# Patient Record
Sex: MALE | Race: WHITE | NOT HISPANIC OR LATINO | Employment: OTHER | URBAN - NONMETROPOLITAN AREA
[De-identification: names, ages, dates, MRNs, and addresses within clinical notes are randomized per-mention and may not be internally consistent; named-entity substitution may affect disease eponyms.]

---

## 2017-09-20 ENCOUNTER — OFFICE VISIT (OUTPATIENT)
Dept: CARDIOLOGY | Facility: CLINIC | Age: 57
End: 2017-09-20
Payer: MEDICARE

## 2017-09-20 ENCOUNTER — NON-PROVIDER VISIT (OUTPATIENT)
Dept: CARDIOLOGY | Facility: CLINIC | Age: 57
End: 2017-09-20
Payer: MEDICARE

## 2017-09-20 VITALS
WEIGHT: 290 LBS | DIASTOLIC BLOOD PRESSURE: 80 MMHG | BODY MASS INDEX: 35.31 KG/M2 | SYSTOLIC BLOOD PRESSURE: 130 MMHG | HEART RATE: 100 BPM | HEIGHT: 76 IN

## 2017-09-20 DIAGNOSIS — R07.9 CHEST PAIN, UNSPECIFIED TYPE: ICD-10-CM

## 2017-09-20 DIAGNOSIS — I10 ESSENTIAL HYPERTENSION, BENIGN: ICD-10-CM

## 2017-09-20 DIAGNOSIS — R07.9 CHEST PAIN, UNSPECIFIED TYPE: Primary | ICD-10-CM

## 2017-09-20 DIAGNOSIS — M94.0 COSTOCHONDRITIS: ICD-10-CM

## 2017-09-20 DIAGNOSIS — E78.5 DYSLIPIDEMIA: ICD-10-CM

## 2017-09-20 PROCEDURE — 93000 ELECTROCARDIOGRAM COMPLETE: CPT | Performed by: INTERNAL MEDICINE

## 2017-09-20 PROCEDURE — 99204 OFFICE O/P NEW MOD 45 MIN: CPT | Performed by: INTERNAL MEDICINE

## 2017-09-20 RX ORDER — HYDROCODONE BITARTRATE AND ACETAMINOPHEN 10; 325 MG/1; MG/1
1-2 TABLET ORAL EVERY 6 HOURS PRN
COMMUNITY
End: 2021-09-29

## 2017-09-20 RX ORDER — NAPROXEN 500 MG/1
500 TABLET ORAL PRN
COMMUNITY
End: 2023-01-18

## 2017-09-20 RX ORDER — HYDROCHLOROTHIAZIDE 25 MG/1
25 TABLET ORAL DAILY
COMMUNITY
End: 2022-06-08 | Stop reason: SDUPTHER

## 2017-09-20 RX ORDER — MINOCYCLINE HYDROCHLORIDE 50 MG/1
50 CAPSULE ORAL 2 TIMES DAILY
COMMUNITY
End: 2021-09-29

## 2017-09-20 RX ORDER — ALLOPURINOL 300 MG/1
300 TABLET ORAL DAILY
COMMUNITY

## 2017-09-20 RX ORDER — LORAZEPAM 1 MG/1
1 TABLET ORAL
COMMUNITY

## 2017-09-20 RX ORDER — LISINOPRIL 40 MG/1
40 TABLET ORAL DAILY
COMMUNITY
End: 2021-09-29

## 2017-09-20 RX ORDER — CYCLOBENZAPRINE HCL 10 MG
10 TABLET ORAL 3 TIMES DAILY PRN
COMMUNITY
End: 2022-12-21

## 2017-09-20 ASSESSMENT — ENCOUNTER SYMPTOMS
DEPRESSION: 1
SENSORY CHANGE: 1
BACK PAIN: 1

## 2017-09-20 NOTE — PROGRESS NOTES
Subjective:   Jacinto Turpin is a 57 -year-old man with history of hypertension, dyslipidemia, chronic pain syndrome, and chronic chest pain referred for the latter.    He tells me today about a sharp midsternal chest discomfort that is nonexertional and improved with pressure on his costochondral junction. That has been present for about 10 years and is quite sporadic occurring approximately 3-4 times per month. It is unchanged in frequency over the duration of time that he has had it. Also of note is that he did sustain a work injury including to his cervical vertebrae in 2007 and has been on narcotic pain medications since that time. His midsternal chest discomfort sounds to have developed in the aftermath of that in probably is partially related to chronic pain syndrome, which he seems to have.    He also tells me about fatigue that he has had for years. He sleeps only for 3-4 hours per night related to pain from his cervical and lumbar degenerative disc disease. He is unaware of his family history as he was adopted. He is a lifetime nonsmoker and drinks 2 beers per day (HeBiiCodeken).    Past Medical History:   Diagnosis Date   • Essential hypertension, benign 9/20/2017   • Dyslipidemia 9/20/2017   • Chronic pain syndrome      No past surgical history on file.  Family History   Problem Relation Age of Onset   • Other Neg Hx      unknown, adopted     History   Smoking Status   • Never Smoker   Smokeless Tobacco   • Never Used     No Known Allergies  Outpatient Encounter Prescriptions as of 9/20/2017   Medication Sig Dispense Refill   • lisinopril (PRINIVIL, ZESTRIL) 40 MG tablet Take 40 mg by mouth every day.     • hydrochlorothiazide (HYDRODIURIL) 25 MG Tab Take 25 mg by mouth every day.     • allopurinol (ZYLOPRIM) 300 MG Tab Take 300 mg by mouth every day.     • lorazepam (ATIVAN) 1 MG Tab Take 1 mg by mouth every four hours as needed for Anxiety.     • minocycline (MINOCIN) 50 MG Cap Take 50 mg by mouth 2 times a  "day.     • Multiple Vitamins-Minerals (CENTRUM SILVER PO) Take  by mouth.     • cyclobenzaprine (FLEXERIL) 10 MG Tab Take 10 mg by mouth 3 times a day as needed.     • naproxen (NAPROSYN) 500 MG Tab Take 500 mg by mouth 2 times a day, with meals.     • hydrocodone/acetaminophen (NORCO)  MG Tab Take 1-2 Tabs by mouth every 6 hours as needed.       No facility-administered encounter medications on file as of 9/20/2017.      Review of Systems   Constitutional: Positive for malaise/fatigue.   Cardiovascular: Positive for chest pain.   Musculoskeletal: Positive for back pain and joint pain.   Neurological: Positive for sensory change.   Psychiatric/Behavioral: Positive for depression. Negative for suicidal ideas.   All other systems reviewed and are negative.       Objective:   /80   Pulse 100   Ht 1.93 m (6' 4\")   Wt (!) 131.5 kg (290 lb)   BMI 35.30 kg/m²     Physical Exam   Constitutional: He is oriented to person, place, and time. He appears well-developed and well-nourished. No distress.   Pleasant, middle-aged man in no distress   HENT:   Head: Normocephalic and atraumatic.   Eyes: Conjunctivae and EOM are normal. Pupils are equal, round, and reactive to light. No scleral icterus.   Neck: Neck supple. No JVD present. No tracheal deviation present.   Cardiovascular: Normal rate, regular rhythm, normal heart sounds and intact distal pulses.  Exam reveals no gallop and no friction rub.    No murmur heard.  Pulses:       Dorsalis pedis pulses are 2+ on the right side, and 2+ on the left side.   No carotid bruits   Pulmonary/Chest: Effort normal and breath sounds normal. No stridor. No respiratory distress. He has no wheezes. He has no rales.   Abdominal: Soft. Bowel sounds are normal. He exhibits no distension.   Musculoskeletal: He exhibits edema (Trace to 1+ bilateral lower extremity edema).   Neurological: He is alert and oriented to person, place, and time.   Skin: Skin is warm and dry. No rash " noted. He is not diaphoretic. No erythema. No pallor.   Psychiatric: He has a normal mood and affect. Judgment and thought content normal.   Vitals reviewed.    Labs, 2/9/2017  CBC: WBC 6.5, hemoglobin 16.1, platelets 158  Chemstrip panel: Sodium 139, potassium 2.9, cleared 102, CO2 24, BUN 17, creatinine 1.19, glucose 137, calcium 9.5  LFTs, 3/25/2017: AST 36, ALT 66, alkaline phosphatase 55, albumin 3.9, total protein 7.5, total bilirubin 0.6  Lipids, 3/25/2017: LDL 92, HDL 74, triglycerides 369, total cholesterol 207    EKG, 7/5/2017, my interpretation: Normal sinus rhythm, no ischemic changes, normal QT interval    Assessment:     1. Chest pain, unspecified type  EKG    Echocardiogram Comp w/o Cont   2. Dyslipidemia     3. Costochondritis     4. Essential hypertension, benign         Medical Decision Making:  Today's Assessment / Status / Plan:     The chest pain that he describes to me today is atypical for angina and consistent with costochondritis. However, in the setting of his lower extremity edema and essential hypertension I did order an echocardiogram to further work that up. Preliminarily, his echocardiogram shows mild concentric left ventricular hypertrophy and normal left ventricular systolic function with no focal wall motion abnormality. At this point, I have not changed his antihypertensive regimen but I did encourage lifestyle modification. I do not think he needs additional testing at this time. I would consider putting him on a statin in the future, but would guide that decision with the coronary calcium scan. Again, at this time in addition to his antihypertensive regimen I would only encourage lifestyle modification.    Carter Rutherford MD  Cardiologist, [facility] [place]

## 2017-09-20 NOTE — LETTER
Name:          Jacinto Turpin   YOB: 1960  Date:     9/20/2017      Mayra Lee, RICKI  [address] [facility] [address]     Carter Rutherford MD  [address], [address] [place] [address]  Phone: [phone]  Back Line: [phone]  Fax: [phone]  E-mail: [email or web address]   Dear Dr. Soni Lee,    We had the pleasure of seeing your patient, Jacinto Turpin, in Cardiology Clinic at [facility] and Vascular today.    As you know, he is a 57-year-old man with history of hypertension, dyslipidemia, chronic pain syndrome, and chronic chest pain referred for the latter.    The chest pain that he describes to me today is atypical for angina and consistent with costochondritis. However, in the setting of his lower extremity edema and essential hypertension I did order an echocardiogram to further work that up. Preliminarily, his echocardiogram shows mild concentric left ventricular hypertrophy and normal left ventricular systolic function with no focal wall motion abnormality. At this point, I have not changed his antihypertensive regimen but I did encourage lifestyle modification. I do not think he needs additional testing at this time. I would consider putting him on a statin in the future, but would guide that decision with the coronary calcium scan. Again, at this time in addition to his antihypertensive regimen I would only encourage lifestyle modification.    Return in about 1 year (around 9/20/2018).    Thank you for the referral and please do not hesitate to contact me at any time. My contact information is listed above.    This note was dictated using Dragon speech recognition software.     A full note including my physical examination and a full list of rectified medications is available in our medical record, and can be faxed as well.    Carter Rutherford MD  Cardiologist  [facility]

## 2017-09-24 LAB — EKG IMPRESSION: NORMAL

## 2017-09-27 LAB
LV EJECT FRACT  99904: 65
LV EJECT FRACT MOD 2C 99903: 76.42
LV EJECT FRACT MOD 4C 99902: 64.64
LV EJECT FRACT MOD BP 99901: 68.9

## 2017-10-02 ENCOUNTER — TELEPHONE (OUTPATIENT)
Dept: CARDIOLOGY | Facility: MEDICAL CENTER | Age: 57
End: 2017-10-02

## 2017-10-02 NOTE — TELEPHONE ENCOUNTER
----- Message from Carter Rutherford M.D. sent at 10/2/2017 12:30 PM PDT -----  Structurally normal heart on echocardiogram.    SHANE JO

## 2017-10-02 NOTE — TELEPHONE ENCOUNTER
Called patient and advised him of Dr. Carter Rutherford's echocardiogram interpretation.    BEVERLEY ORTIZ

## 2019-04-15 ENCOUNTER — OFFICE VISIT (OUTPATIENT)
Dept: CARDIOLOGY | Facility: MEDICAL CENTER | Age: 59
End: 2019-04-15
Payer: MEDICARE

## 2019-04-15 VITALS
HEART RATE: 76 BPM | SYSTOLIC BLOOD PRESSURE: 140 MMHG | BODY MASS INDEX: 36.9 KG/M2 | WEIGHT: 303 LBS | DIASTOLIC BLOOD PRESSURE: 86 MMHG | HEIGHT: 76 IN | OXYGEN SATURATION: 94 %

## 2019-04-15 DIAGNOSIS — E78.5 DYSLIPIDEMIA: ICD-10-CM

## 2019-04-15 DIAGNOSIS — I89.0 LYMPHEDEMA: ICD-10-CM

## 2019-04-15 DIAGNOSIS — I10 ESSENTIAL HYPERTENSION, BENIGN: ICD-10-CM

## 2019-04-15 DIAGNOSIS — Z91.89 OTHER SPECIFIED PERSONAL RISK FACTORS, NOT ELSEWHERE CLASSIFIED: ICD-10-CM

## 2019-04-15 PROCEDURE — 99214 OFFICE O/P EST MOD 30 MIN: CPT | Performed by: INTERNAL MEDICINE

## 2019-04-15 RX ORDER — FLUTICASONE PROPIONATE 50 MCG
SPRAY, SUSPENSION (ML) NASAL
Refills: 6 | COMMUNITY
Start: 2019-04-11 | End: 2022-06-08

## 2019-04-15 RX ORDER — BUDESONIDE AND FORMOTEROL FUMARATE DIHYDRATE 160; 4.5 UG/1; UG/1
1 AEROSOL RESPIRATORY (INHALATION) 2 TIMES DAILY
COMMUNITY
End: 2024-02-07

## 2019-04-15 ASSESSMENT — ENCOUNTER SYMPTOMS
DEPRESSION: 1
BACK PAIN: 1
SENSORY CHANGE: 1

## 2019-04-15 NOTE — LETTER
Name:          Jacinto Turpin   YOB: 1960  Date:     04/15/2019      Watson Interiano P.A.-C.  [address] [place] [id number]     Carter Rutherford MD  [address] [place] [address]  Phone: [phone]  Back Line: [phone]  Fax: [phone]  E-mail: [email or web address]   Dear Dr. Interiano,    We had the pleasure of seeing your patient, Jacinto Turpin, in Cardiology Clinic at [facility].    As you know, he is a 58-year-old man with history of hypertension, dyslipidemia, chronic pain syndrome, and chronic chest pain referred for the latter.    He is doing overall well today from a cardiovascular perspective and has no significant complaints.  He tells me that his lower extremity edema is well controlled with treatment at the lymphedema clinic through [facility].     His blood pressure today is 140/86 mmHg though lower than that at home.     I reviewed with him his previously mildly abnormal lipids and recommended a coronary calcium scan which I ordered in addition to routine labs including his history panel, and lipids.    Return in about 1 year (around 4/15/2020).    Thank you for the referral and please do not hesitate to contact me at any time. My contact information is listed above.    This note was dictated using Dragon speech recognition software.     A full note including my physical examination and a full list of rectified medications is available in our medical record, and can be faxed as well.    Carter Rutherford MD  Cardiologist  [facility]

## 2019-04-15 NOTE — PROGRESS NOTES
Subjective:   Jacinto Turpin is a 58 -year-old man with history of hypertension, dyslipidemia, chronic pain syndrome, and chronic chest pain referred for the latter.    He is doing quite well today, and tells me that he has no more recurrence of the episodes of chest discomfort consistent with his previous costochondritis.  His lower extremity edema has improved with therapy for his lymphedema, and his blood pressure is generally well controlled at home.    He tells me about growing up in [place] with continued ovulation growth leading him ultimately to live in [place], [place].    Past Medical History:   Diagnosis Date   • Chronic pain syndrome    • Dyslipidemia 9/20/2017   • Essential hypertension, benign 9/20/2017     History reviewed. No pertinent surgical history.  Family History   Problem Relation Age of Onset   • Other Neg Hx         unknown, adopted     History   Smoking Status   • Never Smoker   Smokeless Tobacco   • Never Used     No Known Allergies  Outpatient Encounter Prescriptions as of 4/15/2019   Medication Sig Dispense Refill   • fluticasone (FLONASE) 50 MCG/ACT nasal spray   6   • budesonide-formoterol (SYMBICORT) 160-4.5 MCG/ACT Aerosol Inhale 2 Puffs by mouth 2 Times a Day.     • lisinopril (PRINIVIL, ZESTRIL) 40 MG tablet Take 40 mg by mouth every day.     • hydrochlorothiazide (HYDRODIURIL) 25 MG Tab Take 25 mg by mouth every day.     • allopurinol (ZYLOPRIM) 300 MG Tab Take 300 mg by mouth every day.     • lorazepam (ATIVAN) 1 MG Tab Take 1 mg by mouth every four hours as needed for Anxiety.     • minocycline (MINOCIN) 50 MG Cap Take 50 mg by mouth 2 times a day.     • Multiple Vitamins-Minerals (CENTRUM SILVER PO) Take  by mouth.     • cyclobenzaprine (FLEXERIL) 10 MG Tab Take 10 mg by mouth 3 times a day as needed.     • naproxen (NAPROSYN) 500 MG Tab Take 500 mg by mouth 2 times a day, with meals.     • hydrocodone/acetaminophen (NORCO)  MG Tab Take 1-2 Tabs by mouth every  "6 hours as needed.       No facility-administered encounter medications on file as of 4/15/2019.      Review of Systems   Constitutional: Positive for malaise/fatigue.   Cardiovascular: Positive for leg swelling (Improved with lymphedema therapy).   Musculoskeletal: Positive for back pain and joint pain.   Neurological: Positive for sensory change.   Psychiatric/Behavioral: Positive for depression. Negative for suicidal ideas.   All other systems reviewed and are negative.       Objective:   /86 (BP Location: Left arm, Patient Position: Sitting)   Pulse 76   Ht 1.93 m (6' 4\")   Wt (!) 137.4 kg (303 lb)   SpO2 94%   BMI 36.88 kg/m²     Physical Exam   Constitutional: He is oriented to person, place, and time. He appears well-developed and well-nourished. No distress.   Pleasant, middle-aged man in no distress.  Physical examination is unchanged compared to my previous on 9/20/2017 except where specified.   HENT:   Head: Normocephalic and atraumatic.   Eyes: Pupils are equal, round, and reactive to light. Conjunctivae and EOM are normal. No scleral icterus.   Neck: Neck supple. No JVD present. No tracheal deviation present.   Cardiovascular: Normal rate, regular rhythm, normal heart sounds and intact distal pulses.  Exam reveals no gallop and no friction rub.    No murmur heard.  Pulses:       Dorsalis pedis pulses are 2+ on the right side, and 2+ on the left side.   No carotid bruits   Pulmonary/Chest: Effort normal and breath sounds normal. No stridor. No respiratory distress. He has no wheezes. He has no rales.   Abdominal: Soft. Bowel sounds are normal. He exhibits no distension.   Musculoskeletal: He exhibits edema (1-2+ bilateral lower extremity edema).   Neurological: He is alert and oriented to person, place, and time.   Skin: Skin is warm and dry. No rash noted. He is not diaphoretic. No erythema. No pallor.   Psychiatric: He has a normal mood and affect. Judgment and thought content normal. "   Nursing note and vitals reviewed.    Labs, 2/9/2017  CBC: WBC 6.5, hemoglobin 16.1, platelets 158  Chemstrip panel: Sodium 139, potassium 2.9, cleared 102, CO2 24, BUN 17, creatinine 1.19, glucose 137, calcium 9.5  LFTs, 3/25/2017: AST 36, ALT 66, alkaline phosphatase 55, albumin 3.9, total protein 7.5, total bilirubin 0.6  Lipids, 3/25/2017: LDL 92, HDL 74, triglycerides 369, total cholesterol 207    EKG, 7/5/2017, my interpretation: Normal sinus rhythm, no ischemic changes, normal QT interval    Echocardiogram, 9/20/2017: Normal left ventricular ejection fraction, aortic sclerosis, and no other significant valve abnormalities.  Left atrial volume was reported as normal and he had mild concentric left ventricular hypertrophy.    Assessment:     1. Lymphedema     2. Essential hypertension, benign  Basic Metabolic Panel    CBC WITH DIFFERENTIAL   3. Dyslipidemia  LIPID PANEL   4. Other specified personal risk factors, not elsewhere classified  CT-CARDIAC SCORING       Medical Decision Making:  Today's Assessment / Status / Plan:     He is doing overall well today from a cardiovascular perspective and has no significant complaints.  He tells me that his lower extremity edema is well controlled with treatment at the lymphedema clinic through [facility].    His blood pressure today is 140/86 mmHg though lower than that at home.    I reviewed with him his previously mildly abnormal lipids and recommended a coronary calcium scan which I ordered in addition to routine labs including his history panel, and lipids.    Carter Rutherford MD  Cardiologist, [facility] [place]     Return in about 1 year (around 4/15/2020).    Physical Exam   Constitutional: He is oriented to person, place, and time. He appears well-developed and well-nourished. No distress.   Pleasant, middle-aged man in no distress.  Physical examination is unchanged compared to my previous on 9/20/2017 except where specified.   HENT:   Head:  Normocephalic and atraumatic.   Eyes: Pupils are equal, round, and reactive to light. Conjunctivae and EOM are normal. No scleral icterus.   Neck: Neck supple. No JVD present. No tracheal deviation present.   Cardiovascular: Normal rate, regular rhythm, normal heart sounds and intact distal pulses.  Exam reveals no gallop and no friction rub.    No murmur heard.  Pulses:       Dorsalis pedis pulses are 2+ on the right side, and 2+ on the left side.   No carotid bruits   Pulmonary/Chest: Effort normal and breath sounds normal. No stridor. No respiratory distress. He has no wheezes. He has no rales.   Abdominal: Soft. Bowel sounds are normal. He exhibits no distension.   Musculoskeletal: He exhibits edema (1-2+ bilateral lower extremity edema).   Neurological: He is alert and oriented to person, place, and time.   Skin: Skin is warm and dry. No rash noted. He is not diaphoretic. No erythema. No pallor.   Psychiatric: He has a normal mood and affect. Judgment and thought content normal.   Nursing note and vitals reviewed.

## 2019-05-15 ENCOUNTER — HOSPITAL ENCOUNTER (OUTPATIENT)
Dept: RADIOLOGY | Facility: MEDICAL CENTER | Age: 59
End: 2019-05-15
Attending: INTERNAL MEDICINE

## 2019-05-15 DIAGNOSIS — Z91.89 OTHER SPECIFIED PERSONAL RISK FACTORS, NOT ELSEWHERE CLASSIFIED: ICD-10-CM

## 2019-05-15 PROCEDURE — 4410556 CT-CARDIAC SCORING

## 2021-09-22 ENCOUNTER — TELEPHONE (OUTPATIENT)
Dept: CARDIOLOGY | Facility: PHYSICIAN GROUP | Age: 61
End: 2021-09-22

## 2021-09-22 NOTE — TELEPHONE ENCOUNTER
Spoke with patient about appointment with AB on 9/29/2021 at 0815. PP of Dr. Rutherford. Needs to get established with new cardiologist. Recently had blood pressure meds changed and wants to make sure the new dosage is working. Confirmed date, time, and location of appointment.

## 2021-09-29 ENCOUNTER — OFFICE VISIT (OUTPATIENT)
Dept: CARDIOLOGY | Facility: PHYSICIAN GROUP | Age: 61
End: 2021-09-29
Payer: MEDICARE

## 2021-09-29 VITALS
OXYGEN SATURATION: 95 % | DIASTOLIC BLOOD PRESSURE: 90 MMHG | HEIGHT: 76 IN | BODY MASS INDEX: 37.51 KG/M2 | RESPIRATION RATE: 14 BRPM | HEART RATE: 80 BPM | WEIGHT: 308 LBS | SYSTOLIC BLOOD PRESSURE: 144 MMHG

## 2021-09-29 DIAGNOSIS — J45.20 MILD INTERMITTENT ASTHMA, UNSPECIFIED WHETHER COMPLICATED: ICD-10-CM

## 2021-09-29 DIAGNOSIS — M94.0 COSTOCHONDRITIS: ICD-10-CM

## 2021-09-29 DIAGNOSIS — I10 ESSENTIAL HYPERTENSION, BENIGN: ICD-10-CM

## 2021-09-29 DIAGNOSIS — E78.5 DYSLIPIDEMIA: ICD-10-CM

## 2021-09-29 PROCEDURE — 99214 OFFICE O/P EST MOD 30 MIN: CPT | Performed by: NURSE PRACTITIONER

## 2021-09-29 RX ORDER — BUSPIRONE HYDROCHLORIDE 7.5 MG/1
TABLET ORAL
COMMUNITY
Start: 2021-08-26 | End: 2022-06-08

## 2021-09-29 RX ORDER — LOSARTAN POTASSIUM 50 MG/1
75 TABLET ORAL
COMMUNITY
Start: 2021-08-26 | End: 2021-09-29 | Stop reason: SDUPTHER

## 2021-09-29 RX ORDER — LOSARTAN POTASSIUM 100 MG/1
100 TABLET ORAL DAILY
Qty: 100 TABLET | Refills: 3 | Status: SHIPPED | OUTPATIENT
Start: 2021-09-29 | End: 2022-06-08 | Stop reason: SDUPTHER

## 2021-09-29 RX ORDER — MINOCYCLINE HYDROCHLORIDE 100 MG/1
100 CAPSULE ORAL PRN
COMMUNITY
Start: 2021-08-30

## 2021-09-29 RX ORDER — ALBUTEROL SULFATE 90 UG/1
2 AEROSOL, METERED RESPIRATORY (INHALATION)
COMMUNITY
Start: 2021-08-26

## 2021-09-29 ASSESSMENT — ENCOUNTER SYMPTOMS
SHORTNESS OF BREATH: 0
FEVER: 0
PALPITATIONS: 0
MYALGIAS: 0
BRUISES/BLEEDS EASILY: 0
ORTHOPNEA: 0
COUGH: 0
NAUSEA: 0
BACK PAIN: 1
PND: 0
INSOMNIA: 0
ABDOMINAL PAIN: 0
CHILLS: 0
LOSS OF CONSCIOUSNESS: 0
DIZZINESS: 0
HEADACHES: 0

## 2021-09-29 NOTE — PROGRESS NOTES
Chief Complaint   Patient presents with   • Follow-Up   • HTN (Controlled)   • Hyperlipidemia   • Costochondritis   • Asthma       Subjective     Jacinto Turpin is a 61 y.o. male who presents today for overdue annual follow-up of HTN, hyperlipidemia and costochondritis.    Jacinto is a 61 year old male with history of hypertension, hyperlipidemia, mild asthma and costochondritis, previously followed by Dr. LANG Rutherford, and last seen in April 2019.    Since then, he did have an open wound on his left foot that has finally healed. From a cardiac standpoint, he has been stable: no chest pain, pressure or discomfort; no symptomatic palpitations; no shortness of breath, orthopnea or PND; no dizziness or syncope; very mild, stable LE edema. BP is quite labile, running 100-150 systolic at home. He is now taking Losartan 75mg daily. He sleeps fair.    Past Medical History:   Diagnosis Date   • Asthma    • Chronic pain syndrome    • Costochondritis    • Dyslipidemia    • Essential hypertension, benign 09/2017     Echocardiogram with normal LV size, mild concentric LVH, LVEF 65%. Normal RA, LA and RV. Trace MR.     History reviewed. No pertinent surgical history.  Family History   Problem Relation Age of Onset   • Other Neg Hx         unknown, adopted     Social History     Socioeconomic History   • Marital status: Single     Spouse name: Not on file   • Number of children: Not on file   • Years of education: Not on file   • Highest education level: Not on file   Occupational History   • Not on file   Tobacco Use   • Smoking status: Never Smoker   • Smokeless tobacco: Never Used   Substance and Sexual Activity   • Alcohol use: Yes     Alcohol/week: 8.4 oz     Types: 14 Cans of beer per week   • Drug use: No   • Sexual activity: Not on file   Other Topics Concern   • Not on file   Social History Narrative   • Not on file     Social Determinants of Health     Financial Resource Strain:    • Difficulty of Paying Living Expenses:     Food Insecurity:    • Worried About Running Out of Food in the Last Year:    • Ran Out of Food in the Last Year:    Transportation Needs:    • Lack of Transportation (Medical):    • Lack of Transportation (Non-Medical):    Physical Activity:    • Days of Exercise per Week:    • Minutes of Exercise per Session:    Stress:    • Feeling of Stress :    Social Connections:    • Frequency of Communication with Friends and Family:    • Frequency of Social Gatherings with Friends and Family:    • Attends Denominational Services:    • Active Member of Clubs or Organizations:    • Attends Club or Organization Meetings:    • Marital Status:    Intimate Partner Violence:    • Fear of Current or Ex-Partner:    • Emotionally Abused:    • Physically Abused:    • Sexually Abused:      No Known Allergies  Outpatient Encounter Medications as of 9/29/2021   Medication Sig Dispense Refill   • minocycline (MINOCIN) 100 MG Cap      • busPIRone (BUSPAR) 7.5 MG tablet      • albuterol 108 (90 Base) MCG/ACT Aero Soln inhalation aerosol      • VITAMIN E PO Take  by mouth.     • Omega-3 Fatty Acids (OMEGA 3 PO) Take  by mouth.     • losartan (COZAAR) 100 MG Tab Take 1 Tablet by mouth every day. 100 Tablet 3   • fluticasone (FLONASE) 50 MCG/ACT nasal spray   6   • budesonide-formoterol (SYMBICORT) 160-4.5 MCG/ACT Aerosol Inhale 1 Puff 2 times a day.     • hydrochlorothiazide (HYDRODIURIL) 25 MG Tab Take 25 mg by mouth every day.     • allopurinol (ZYLOPRIM) 300 MG Tab Take 300 mg by mouth every day.     • lorazepam (ATIVAN) 1 MG Tab Take 1 mg by mouth every four hours as needed for Anxiety.     • Multiple Vitamins-Minerals (CENTRUM SILVER PO) Take  by mouth.     • cyclobenzaprine (FLEXERIL) 10 MG Tab Take 10 mg by mouth 3 times a day as needed.     • naproxen (NAPROSYN) 500 MG Tab Take 500 mg by mouth 2 times a day, with meals.     • [DISCONTINUED] losartan (COZAAR) 50 MG Tab 75 mg.     • [DISCONTINUED] lisinopril (PRINIVIL, ZESTRIL) 40 MG tablet  "Take 40 mg by mouth every day. (Patient not taking: Reported on 9/29/2021)     • [DISCONTINUED] minocycline (MINOCIN) 50 MG Cap Take 50 mg by mouth 2 times a day. (Patient not taking: Reported on 9/29/2021)     • [DISCONTINUED] hydrocodone/acetaminophen (NORCO)  MG Tab Take 1-2 Tabs by mouth every 6 hours as needed. (Patient not taking: Reported on 9/29/2021)       No facility-administered encounter medications on file as of 9/29/2021.     Review of Systems   Constitutional: Negative for chills and fever.   HENT: Negative for congestion.    Respiratory: Negative for cough and shortness of breath.    Cardiovascular: Negative for chest pain, palpitations, orthopnea, leg swelling and PND.   Gastrointestinal: Negative for abdominal pain and nausea.   Musculoskeletal: Positive for back pain and joint pain. Negative for myalgias.   Skin: Negative for rash.   Neurological: Negative for dizziness, loss of consciousness and headaches.   Endo/Heme/Allergies: Does not bruise/bleed easily.   Psychiatric/Behavioral: The patient does not have insomnia.               Objective     /90 (BP Location: Left arm, Patient Position: Sitting, BP Cuff Size: Adult)   Pulse 80   Resp 14   Ht 1.93 m (6' 4\")   Wt (!) 140 kg (308 lb)   SpO2 95%   BMI 37.49 kg/m²     Physical Exam  Constitutional:       Appearance: He is well-developed.      Comments: BMI 37.49   HENT:      Head: Normocephalic.   Neck:      Vascular: No JVD.   Cardiovascular:      Rate and Rhythm: Normal rate and regular rhythm.      Heart sounds: Normal heart sounds.   Pulmonary:      Effort: Pulmonary effort is normal. No respiratory distress.      Breath sounds: Normal breath sounds. No wheezing or rales.   Abdominal:      General: Bowel sounds are normal. There is no distension.      Palpations: Abdomen is soft.      Tenderness: There is no abdominal tenderness.   Musculoskeletal:         General: Normal range of motion.      Cervical back: Normal range of " motion and neck supple.   Skin:     General: Skin is warm and dry.      Findings: No rash.   Neurological:      Mental Status: He is alert and oriented to person, place, and time.       CONCLUSIONS OF ECHOCARDIOGRAM OF 9/27/2017:  No prior study is available for comparison.   Normal left ventricular systolic function.   No evidence of valvular abnormality based on Doppler evaluation.   Unable to estimate pulmonary artery pressure due to an inadequate   tricuspid regurgitant jet.     RESULTS OF CTCS OF 5/15/2019:  LM 0.0  LCx 0.0  LAD 0.0  RCA 0.0  Total 0.00  Assessment & Plan     1. Essential hypertension, benign  losartan (COZAAR) 100 MG Tab   2. Dyslipidemia     3. Costochondritis     4. Mild intermittent asthma, unspecified whether complicated         Medical Decision Making: Today's Assessment/Status/Plan:      1. Treated with Losartan 75mg. To increase to 100mg once daily. Monitor BP at home, and contact us in 2-3 weeks with BP report.    2. Hyperlipidemia, not currently on any therapy. CTCS score is 0.00. No need for statin at this time.    3. Costochondritis, with no recent symptoms.    4. Asthma, mild, treated with inhalers, stable.    As above, increase Losartan. Monitor BP at home. Otherwise, same medications. FU with me in 6 months, sooner if clinical condition changes.

## 2022-06-08 ENCOUNTER — OFFICE VISIT (OUTPATIENT)
Dept: CARDIOLOGY | Facility: PHYSICIAN GROUP | Age: 62
End: 2022-06-08
Payer: MEDICARE

## 2022-06-08 VITALS
HEIGHT: 76 IN | RESPIRATION RATE: 18 BRPM | WEIGHT: 315 LBS | BODY MASS INDEX: 38.36 KG/M2 | DIASTOLIC BLOOD PRESSURE: 88 MMHG | SYSTOLIC BLOOD PRESSURE: 136 MMHG | OXYGEN SATURATION: 96 % | HEART RATE: 98 BPM

## 2022-06-08 DIAGNOSIS — E78.5 DYSLIPIDEMIA: ICD-10-CM

## 2022-06-08 DIAGNOSIS — I10 ESSENTIAL HYPERTENSION, BENIGN: ICD-10-CM

## 2022-06-08 DIAGNOSIS — J45.20 MILD INTERMITTENT ASTHMA, UNSPECIFIED WHETHER COMPLICATED: ICD-10-CM

## 2022-06-08 DIAGNOSIS — I89.0 LYMPHEDEMA: ICD-10-CM

## 2022-06-08 PROCEDURE — 99214 OFFICE O/P EST MOD 30 MIN: CPT | Performed by: NURSE PRACTITIONER

## 2022-06-08 RX ORDER — ALBUTEROL SULFATE 90 UG/1
2 AEROSOL, METERED RESPIRATORY (INHALATION) EVERY 6 HOURS PRN
COMMUNITY
End: 2022-06-08

## 2022-06-08 RX ORDER — HYDROCHLOROTHIAZIDE 25 MG/1
25 TABLET ORAL DAILY
Qty: 100 TABLET | Refills: 3 | Status: SHIPPED | OUTPATIENT
Start: 2022-06-08 | End: 2023-07-13

## 2022-06-08 RX ORDER — LOSARTAN POTASSIUM 100 MG/1
100 TABLET ORAL DAILY
Qty: 100 TABLET | Refills: 3 | Status: SHIPPED | OUTPATIENT
Start: 2022-06-08 | End: 2023-07-13

## 2022-06-08 RX ORDER — BUDESONIDE AND FORMOTEROL FUMARATE DIHYDRATE 160; 4.5 UG/1; UG/1
2 AEROSOL RESPIRATORY (INHALATION)
COMMUNITY
End: 2022-06-08

## 2022-06-08 RX ORDER — LOSARTAN POTASSIUM 50 MG/1
100 TABLET ORAL DAILY
COMMUNITY
End: 2022-06-08

## 2022-06-08 RX ORDER — BUSPIRONE HYDROCHLORIDE 7.5 MG/1
7.5 TABLET ORAL DAILY
COMMUNITY
End: 2022-06-08

## 2022-06-08 RX ORDER — FLUTICASONE PROPIONATE 50 MCG
SPRAY, SUSPENSION (ML) NASAL DAILY
COMMUNITY

## 2022-06-08 RX ORDER — METOPROLOL SUCCINATE 50 MG/1
50 TABLET, EXTENDED RELEASE ORAL DAILY
Qty: 100 TABLET | Refills: 3 | Status: SHIPPED | OUTPATIENT
Start: 2022-06-08 | End: 2023-07-13

## 2022-06-08 RX ORDER — HYDROCHLOROTHIAZIDE 25 MG/1
25 TABLET ORAL EVERY MORNING
COMMUNITY
End: 2022-06-08

## 2022-06-08 ASSESSMENT — ENCOUNTER SYMPTOMS
ABDOMINAL PAIN: 0
ORTHOPNEA: 0
INSOMNIA: 0
PND: 0
DIZZINESS: 0
FEVER: 0
BRUISES/BLEEDS EASILY: 0
HEADACHES: 0
MYALGIAS: 0
COUGH: 0
CHILLS: 0
BACK PAIN: 1
SHORTNESS OF BREATH: 0
PALPITATIONS: 0
NAUSEA: 0
LOSS OF CONSCIOUSNESS: 0

## 2022-06-08 NOTE — PROGRESS NOTES
Chief Complaint   Patient presents with   • Follow-Up   • HTN (Controlled)   • Hyperlipidemia   • Asthma       Subjective     Jacinto Turpin is a 61 y.o. male who presents today for follow-up of elevated BP, hyperlipidemia, and asthma.    Jacinto is a 61 year old male with history of hypertension, hyperlipidemia (with CTCS score 0.00 in 2019), mild asthma and costochondritis, last seen by me in September 2021.    He does have a wound on his right second toe, followed by wound clinic at [facility].    More recently, he has had some episodes of high BP, running 170-190 systolic over  diastolic. He does admit to putting on some weight.      From a cardiac standpoint, he has been stable: no chest pain, pressure or discomfort; no symptomatic palpitations; no shortness of breath, orthopnea or PND; no dizziness or syncope; stable LE edema. He does have chronic lymphedema.    Past Medical History:   Diagnosis Date   • Asthma    • Chronic pain syndrome    • Costochondritis    • Dyslipidemia    • Essential hypertension, benign 09/2017     Echocardiogram with normal LV size, mild concentric LVH, LVEF 65%. Normal RA, LA and RV. Trace MR.   • Lymphedema      History reviewed. No pertinent surgical history.  Family History   Problem Relation Age of Onset   • Other Neg Hx         unknown, adopted     Social History     Socioeconomic History   • Marital status: Single     Spouse name: Not on file   • Number of children: Not on file   • Years of education: Not on file   • Highest education level: Not on file   Occupational History   • Not on file   Tobacco Use   • Smoking status: Never Smoker   • Smokeless tobacco: Never Used   Substance and Sexual Activity   • Alcohol use: Not Currently     Alcohol/week: 12.6 oz     Types: 21 Standard drinks or equivalent per week   • Drug use: No   • Sexual activity: Not on file   Other Topics Concern   • Not on file   Social History Narrative   • Not on file     Social Determinants of Health      Financial Resource Strain: Not on file   Food Insecurity: Not on file   Transportation Needs: Not on file   Physical Activity: Not on file   Stress: Not on file   Social Connections: Not on file   Intimate Partner Violence: Not on file   Housing Stability: Not on file     No Known Allergies  Outpatient Encounter Medications as of 6/8/2022   Medication Sig Dispense Refill   • fluticasone (FLONASE) 50 MCG/ACT nasal spray Administer  into affected nostril(S) every day.     • metoprolol SR (TOPROL XL) 50 MG TABLET SR 24 HR Take 1 Tablet by mouth every day. 100 Tablet 3   • losartan (COZAAR) 100 MG Tab Take 1 Tablet by mouth every day. 100 Tablet 3   • hydroCHLOROthiazide (HYDRODIURIL) 25 MG Tab Take 1 Tablet by mouth every day. 100 Tablet 3   • minocycline (MINOCIN) 100 MG Cap      • albuterol 108 (90 Base) MCG/ACT Aero Soln inhalation aerosol      • VITAMIN E PO Take  by mouth.     • Omega-3 Fatty Acids (OMEGA 3 PO) Take  by mouth.     • budesonide-formoterol (SYMBICORT) 160-4.5 MCG/ACT Aerosol Inhale 1 Puff 2 times a day.     • allopurinol (ZYLOPRIM) 300 MG Tab Take 300 mg by mouth every day.     • lorazepam (ATIVAN) 1 MG Tab Take 1 mg by mouth every four hours as needed for Anxiety.     • Multiple Vitamins-Minerals (CENTRUM SILVER PO) Take  by mouth.     • cyclobenzaprine (FLEXERIL) 10 mg Tab Take 10 mg by mouth 3 times a day as needed.     • naproxen (NAPROSYN) 500 MG Tab Take 500 mg by mouth 2 times a day, with meals.     • [DISCONTINUED] busPIRone (BUSPAR) 7.5 MG tablet Take 7.5 mg by mouth every day. (Patient not taking: Reported on 6/8/2022)     • [DISCONTINUED] losartan (COZAAR) 50 MG Tab Take 100 mg by mouth every day. (Patient not taking: Reported on 6/8/2022)     • [DISCONTINUED] albuterol 108 (90 Base) MCG/ACT Aero Soln inhalation aerosol Inhale 2 Puffs every 6 hours as needed. (Patient not taking: Reported on 6/8/2022)     • [DISCONTINUED] budesonide-formoterol (SYMBICORT) 160-4.5 MCG/ACT Aerosol  "Inhale 2 Puffs. (Patient not taking: Reported on 6/8/2022)     • [DISCONTINUED] hydroCHLOROthiazide (HYDRODIURIL) 25 MG Tab Take 25 mg by mouth every morning. (Patient not taking: Reported on 6/8/2022)     • [DISCONTINUED] busPIRone (BUSPAR) 7.5 MG tablet  (Patient not taking: Reported on 6/8/2022)     • [DISCONTINUED] losartan (COZAAR) 100 MG Tab Take 1 Tablet by mouth every day. 100 Tablet 3   • [DISCONTINUED] fluticasone (FLONASE) 50 MCG/ACT nasal spray  (Patient not taking: Reported on 6/8/2022)  6   • [DISCONTINUED] hydrochlorothiazide (HYDRODIURIL) 25 MG Tab Take 25 mg by mouth every day.       No facility-administered encounter medications on file as of 6/8/2022.     Review of Systems   Constitutional: Negative for chills and fever.   HENT: Negative for congestion.    Respiratory: Negative for cough and shortness of breath.    Cardiovascular: Positive for leg swelling. Negative for chest pain, palpitations, orthopnea and PND.        History of lymphedema.   Gastrointestinal: Negative for abdominal pain and nausea.   Musculoskeletal: Positive for back pain and joint pain. Negative for myalgias.   Skin: Negative for rash.   Neurological: Negative for dizziness, loss of consciousness and headaches.   Endo/Heme/Allergies: Does not bruise/bleed easily.   Psychiatric/Behavioral: The patient does not have insomnia.               Objective     /88 (BP Location: Left arm, Patient Position: Sitting, BP Cuff Size: Adult)   Pulse 98   Resp 18   Ht 1.93 m (6' 4\")   Wt (!) 144 kg (317 lb 0.3 oz)   SpO2 96%   BMI 38.59 kg/m²     Physical Exam  Constitutional:       Appearance: He is well-developed.      Comments: BMI 38.59 (weight is up)   HENT:      Head: Normocephalic.   Neck:      Vascular: No JVD.   Cardiovascular:      Rate and Rhythm: Normal rate and regular rhythm.      Heart sounds: Normal heart sounds.   Pulmonary:      Effort: Pulmonary effort is normal. No respiratory distress.      Breath sounds: " Normal breath sounds. No wheezing or rales.   Abdominal:      General: Bowel sounds are normal. There is no distension.      Palpations: Abdomen is soft.      Tenderness: There is no abdominal tenderness.   Musculoskeletal:         General: Normal range of motion.      Cervical back: Normal range of motion and neck supple.   Skin:     General: Skin is warm and dry.      Findings: No rash.   Neurological:      Mental Status: He is alert and oriented to person, place, and time.       CONCLUSIONS OF ECHOCARDIOGRAM OF 9/27/2017:  No prior study is available for comparison.   Normal left ventricular systolic function.   No evidence of valvular abnormality based on Doppler evaluation.   Unable to estimate pulmonary artery pressure due to an inadequate   tricuspid regurgitant jet.    RESULTS OF CTCS OF 5/15/2019:  LM 0.0  LCx 0.0  LAD 0.0  RCA 0.0  Total 0.00    Copies of recent labs have been requested from [facility].    Assessment & Plan     1. Essential hypertension, benign  metoprolol SR (TOPROL XL) 50 MG TABLET SR 24 HR    losartan (COZAAR) 100 MG Tab    hydroCHLOROthiazide (HYDRODIURIL) 25 MG Tab   2. Dyslipidemia  Comp Metabolic Panel    Lipid Profile   3. Mild intermittent asthma, unspecified whether complicated     4. Lymphedema         Medical Decision Making: Today's Assessment/Status/Plan:      1. Hypertension, treated with Losartan and HCTZ, fair control. To add some Toprol XL 50mg once daily. Check BP at home. Try to work on weigh tlsos.    2. Hyperlipidemia, to obtain recent labs.    3. Asthma, treated/stable.    4. Lymphedema, with wound of right second toe, followed by wound clinic.    Same medications, and add Toprol XL 50mg once daily. Follow-up in 6 months, sooner if clinical condition changes.

## 2022-12-16 ENCOUNTER — TELEPHONE (OUTPATIENT)
Dept: CARDIOLOGY | Facility: MEDICAL CENTER | Age: 62
End: 2022-12-16
Payer: MEDICARE

## 2022-12-16 NOTE — TELEPHONE ENCOUNTER
Spoke to patient regarding lab work ordered by AB at last OV. Patient requested lab orders to be sent to [facility]. Patient stated he will complete labs prior to his upcoming appt with AB on 12/21/22. Patient is aware lab work is fasting. Lab orders faxed to [facility] lab at fax # [phone]. Confirmed with patient upcoming appt date and time with AB.

## 2022-12-21 ENCOUNTER — TELEPHONE (OUTPATIENT)
Dept: CARDIOLOGY | Facility: PHYSICIAN GROUP | Age: 62
End: 2022-12-21

## 2022-12-21 ENCOUNTER — OFFICE VISIT (OUTPATIENT)
Dept: CARDIOLOGY | Facility: PHYSICIAN GROUP | Age: 62
End: 2022-12-21
Payer: MEDICARE

## 2022-12-21 VITALS
WEIGHT: 315 LBS | OXYGEN SATURATION: 97 % | BODY MASS INDEX: 38.36 KG/M2 | HEART RATE: 92 BPM | SYSTOLIC BLOOD PRESSURE: 136 MMHG | RESPIRATION RATE: 16 BRPM | HEIGHT: 76 IN | DIASTOLIC BLOOD PRESSURE: 84 MMHG

## 2022-12-21 DIAGNOSIS — R79.89 ELEVATED LFTS: ICD-10-CM

## 2022-12-21 DIAGNOSIS — J45.20 MILD INTERMITTENT ASTHMA, UNSPECIFIED WHETHER COMPLICATED: ICD-10-CM

## 2022-12-21 DIAGNOSIS — E78.5 DYSLIPIDEMIA: ICD-10-CM

## 2022-12-21 DIAGNOSIS — I10 ESSENTIAL HYPERTENSION, BENIGN: ICD-10-CM

## 2022-12-21 PROCEDURE — 99214 OFFICE O/P EST MOD 30 MIN: CPT | Performed by: NURSE PRACTITIONER

## 2022-12-21 ASSESSMENT — ENCOUNTER SYMPTOMS
LOSS OF CONSCIOUSNESS: 0
HEADACHES: 0
SHORTNESS OF BREATH: 0
ABDOMINAL PAIN: 0
ORTHOPNEA: 0
BACK PAIN: 1
NAUSEA: 0
CHILLS: 0
COUGH: 0
PALPITATIONS: 0
PND: 0
INSOMNIA: 0
FEVER: 0
BRUISES/BLEEDS EASILY: 0
MYALGIAS: 0
DIZZINESS: 0

## 2022-12-21 NOTE — PROGRESS NOTES
Chief Complaint   Patient presents with    Follow-Up    HTN (Controlled)    Hyperlipidemia       Subjective     Jacinto Turpin is a 62 y.o. male who presents today for annual follow-up of HTN and hyperlipidemia.    Jacinto is a 62 year old male with history of hypertension, hyperlipidemia, mild asthma and costochondritis, previously followed by Dr. LANG Rutherford, and last seen by me in September 2021.     Since then, he did have an open wound on his left foot that has finally healed. From a cardiac standpoint, he has been stable: no chest pain, pressure or discomfort; no symptomatic palpitations; no shortness of breath, orthopnea or PND; no dizziness or syncope; very mild, stable LE edema. BP is quite labile, running 100-150 systolic at home. He is now taking Losartan 75mg daily. He sleeps fair.    Past Medical History:   Diagnosis Date    Asthma     Chronic pain syndrome     Costochondritis     Dyslipidemia     Essential hypertension, benign 09/2017     Echocardiogram with normal LV size, mild concentric LVH, LVEF 65%. Normal RA, LA and RV. Trace MR.    Lymphedema      History reviewed. No pertinent surgical history.  Family History   Problem Relation Age of Onset    Other Neg Hx         unknown, adopted     Social History     Socioeconomic History    Marital status: Single     Spouse name: Not on file    Number of children: Not on file    Years of education: Not on file    Highest education level: Not on file   Occupational History    Not on file   Tobacco Use    Smoking status: Never    Smokeless tobacco: Never   Substance and Sexual Activity    Alcohol use: Not Currently     Alcohol/week: 18.0 oz     Types: 30 Cans of beer per week    Drug use: No    Sexual activity: Not on file   Other Topics Concern    Not on file   Social History Narrative    Not on file     Social Determinants of Health     Financial Resource Strain: Not on file   Food Insecurity: Not on file   Transportation Needs: Not on file   Physical  Activity: Not on file   Stress: Not on file   Social Connections: Not on file   Intimate Partner Violence: Not on file   Housing Stability: Not on file     No Known Allergies  Outpatient Encounter Medications as of 12/21/2022   Medication Sig Dispense Refill    fluticasone (FLONASE) 50 MCG/ACT nasal spray Administer  into affected nostril(S) every day.      metoprolol SR (TOPROL XL) 50 MG TABLET SR 24 HR Take 1 Tablet by mouth every day. 100 Tablet 3    losartan (COZAAR) 100 MG Tab Take 1 Tablet by mouth every day. 100 Tablet 3    hydroCHLOROthiazide (HYDRODIURIL) 25 MG Tab Take 1 Tablet by mouth every day. 100 Tablet 3    minocycline (MINOCIN) 100 MG Cap Take 100 mg by mouth as needed.      albuterol 108 (90 Base) MCG/ACT Aero Soln inhalation aerosol       VITAMIN E PO Take  by mouth.      Omega-3 Fatty Acids (OMEGA 3 PO) Take  by mouth.      budesonide-formoterol (SYMBICORT) 160-4.5 MCG/ACT Aerosol Inhale 1 Puff 2 times a day.      allopurinol (ZYLOPRIM) 300 MG Tab Take 300 mg by mouth every day.      lorazepam (ATIVAN) 1 MG Tab Take 1 mg by mouth every four hours as needed for Anxiety.      Multiple Vitamins-Minerals (CENTRUM SILVER PO) Take  by mouth.      naproxen (NAPROSYN) 500 MG Tab Take 500 mg by mouth as needed.      [DISCONTINUED] cyclobenzaprine (FLEXERIL) 10 mg Tab Take 10 mg by mouth 3 times a day as needed. (Patient not taking: Reported on 12/21/2022)       No facility-administered encounter medications on file as of 12/21/2022.     Review of Systems   Constitutional:  Negative for chills and fever.   HENT:  Negative for congestion.    Respiratory:  Negative for cough and shortness of breath.    Cardiovascular:  Negative for chest pain, palpitations, orthopnea, leg swelling and PND.   Gastrointestinal:  Negative for abdominal pain and nausea.   Musculoskeletal:  Positive for back pain and joint pain. Negative for myalgias.   Skin:  Negative for rash.   Neurological:  Negative for dizziness, loss of  "consciousness and headaches.   Endo/Heme/Allergies:  Does not bruise/bleed easily.   Psychiatric/Behavioral:  The patient does not have insomnia.             Objective     /84 (BP Location: Left arm, Patient Position: Sitting, BP Cuff Size: Adult)   Pulse 92   Resp 16   Ht 1.93 m (6' 4\")   Wt (!) 145 kg (319 lb 3.6 oz)   SpO2 97%   BMI 38.86 kg/m²     Physical Exam  Constitutional:       Appearance: He is well-developed.      Comments: BMI 38.86   HENT:      Head: Normocephalic.   Neck:      Vascular: No JVD.   Cardiovascular:      Rate and Rhythm: Normal rate and regular rhythm.      Heart sounds: Normal heart sounds.   Pulmonary:      Effort: Pulmonary effort is normal. No respiratory distress.      Breath sounds: Normal breath sounds. No wheezing or rales.   Abdominal:      General: Bowel sounds are normal. There is no distension.      Palpations: Abdomen is soft.      Tenderness: There is no abdominal tenderness.   Musculoskeletal:         General: Normal range of motion.      Cervical back: Normal range of motion and neck supple.   Skin:     General: Skin is warm and dry.      Findings: No rash.   Neurological:      Mental Status: He is alert and oriented to person, place, and time.     FINDINGS OF CTCS OF 5/15/2019:  Coronary calcification:  LMA - 0.0  LCX - 0.0  LAD - 0.0  RCA - 0.0  PDA - 0.0  Total Calcium Score: 0.0    CONCLUSIONS OF ECHOCARDIOGRAM OF 9/27/2017:  No prior study is available for comparison.   Normal left ventricular systolic function.   No evidence of valvular abnormality based on Doppler evaluation.   Unable to estimate pulmonary artery pressure due to an inadequate   tricuspid regurgitant jet.    LABS AS OF 12/19/2022:  Vitamin D 61.5  HgbA1c 5.9  PSA 1.11  TSH 1.30  Cholesterol 203  Triglycerides 114  HDL 77  LDL 92  Cholesterol/HDL ratio 2.6  Potassium 4.0  Glucose 133  BUN 25  Creatinine 1.43  AST 44    GFR 53  WBC 8.03  RBC 5.67  Hgb 17.0  Hct 50.9  Platelets 13.7   "   LABS AS OF 6/2/2022:  WBC 5.19  RBC 5.31  Hgb 16.1  Hct 47.9  Platelets 160  Glucose 155  BUN 16  Creatinine 1.30  Potassium 3.67      Cholesterol 181  Triglycerides 196  HDL 57  LDL 92  Cholesterol/HDL ration 3.2  GFR 60  HgbA1c 5.6    Assessment & Plan     1. Essential hypertension, benign  CBC WITH DIFFERENTIAL      2. Dyslipidemia        3. Elevated LFTs  Comp Metabolic Panel      4. Mild intermittent asthma, unspecified whether complicated            Medical Decision Making: Today's Assessment/Status/Plan:      1. Hypertension, treated with Toprol XL50mg, Losartan 100mg and HCTZ 25mg once daily. BP is good today.    2. Hyperlipidemia, treated with diet alone. CTCS in 2019 was 0.00.    3. Elevated LFTs, improved. Watch EtOH and Tylenol intake. Will monitor over time.    4. Mild asthma, on inhalers, stable.    Same medications for now. To repeat labs. Follow-up 6-12 months, sooner if clinical condition changes.

## 2023-05-24 ENCOUNTER — OFFICE VISIT (OUTPATIENT)
Dept: NEUROLOGY | Facility: MEDICAL CENTER | Age: 63
End: 2023-05-24
Attending: PSYCHIATRY & NEUROLOGY
Payer: MEDICARE

## 2023-05-24 VITALS
TEMPERATURE: 98.2 F | HEART RATE: 96 BPM | OXYGEN SATURATION: 98 % | WEIGHT: 301.81 LBS | HEIGHT: 76 IN | BODY MASS INDEX: 36.75 KG/M2 | DIASTOLIC BLOOD PRESSURE: 88 MMHG | SYSTOLIC BLOOD PRESSURE: 128 MMHG

## 2023-05-24 DIAGNOSIS — G62.1 ALCOHOLIC PERIPHERAL NEUROPATHY (HCC): ICD-10-CM

## 2023-05-24 PROCEDURE — 3079F DIAST BP 80-89 MM HG: CPT | Performed by: PSYCHIATRY & NEUROLOGY

## 2023-05-24 PROCEDURE — 99212 OFFICE O/P EST SF 10 MIN: CPT | Performed by: PSYCHIATRY & NEUROLOGY

## 2023-05-24 PROCEDURE — 99205 OFFICE O/P NEW HI 60 MIN: CPT | Performed by: PSYCHIATRY & NEUROLOGY

## 2023-05-24 PROCEDURE — 3074F SYST BP LT 130 MM HG: CPT | Performed by: PSYCHIATRY & NEUROLOGY

## 2023-05-24 ASSESSMENT — PATIENT HEALTH QUESTIONNAIRE - PHQ9
5. POOR APPETITE OR OVEREATING: 0 - NOT AT ALL
CLINICAL INTERPRETATION OF PHQ2 SCORE: 2
SUM OF ALL RESPONSES TO PHQ QUESTIONS 1-9: 8

## 2023-05-24 NOTE — PROGRESS NOTES
"[facility] NEUROLOGY  GENERAL NEUROLOGY  NEW PATIENT VISIT    Referral source: Azael Morel PA-C    CC: \"neuralgia and neuritis, unspecified\"    HISTORY OF ILLNESS:  Jacinto Turpin is a 62 y.o. man with a history most notable for asthma (mild, intermittent), HTN, and HLD.  Today, he was unaccompanied, and he provided the following history:    Childhood:  Jacinto had difficulty with balance.  He did not participate in sports in school due to asthma.    During early- and middle adulthood his symptoms were stable.    1983:  Jacinto was a \".\"  He has struggled with back pain for ~40 years.  He started drinking beer (2-3 6-packs/day).    2003:  Jacinto developed numbness in the feet.  He describes this as a \"loss of sensation.\"  This was attributed to neuropathy or lymphedema.    2007:  Jacinto experienced a work injury.  He was struck on the head, and three of his cervical discs were \"blown.\"  He damaged several teeth.  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  He was told he had a \"stroke.\"  His doctors didn't recommend any changes to his medication regimen.    The back pain became progressively worse.  Now, he can't even stretch out his back.    6/8/2021:  EMG/NCS was performed.  The results can be found in the media section in the document uploaded 6/9/2021 on page 8.    His balance has worsened during the past few years.    Jacinto was adopted, so his family history is unknown.      MEDICAL AND SURGICAL HISTORY:  Past Medical History:   Diagnosis Date    Asthma     Bronchitis     Chronic pain syndrome     Costochondritis     Dyslipidemia     Essential hypertension, benign 09/2017     Echocardiogram with normal LV size, mild concentric LVH, LVEF 65%. Normal RA, LA and RV. Trace MR.    Lymphedema     Neuropathy     Stroke (HCC)     small 06 or 07, no residual deficits     Past Surgical History:   Procedure Laterality Date    PB OSTEOTOMY METATARSAL (NOT 1ST) Left 1/27/2023    Procedure: LEFT FOURTH METATARSAL OSTEOTOMY;  " Surgeon: Edgardo Quiles D.P.M.;  Location: SURGERY [facility];  Service: Podiatry    HERNIA REPAIR      above scrotum, also undesceded testicle    MUSCLE REPAIR Right     calf and tendon repair    ORCHIECTOMY      SINUSOTOMIES      turnbinates, clipped uvula, deviated septum     MEDICATIONS:  Current Outpatient Medications   Medication Sig    meloxicam (MOBIC) 7.5 MG Tab Take 7.5 mg by mouth 1 time a day as needed.    asa/apap/caffeine (EXCEDRIN) 250-250-65 MG Tab Take 1 Tablet by mouth every 6 hours as needed for Headache. Indications: Arthritis, Headache    fluticasone (FLONASE) 50 MCG/ACT nasal spray Administer  into affected nostril(S) every day.    metoprolol SR (TOPROL XL) 50 MG TABLET SR 24 HR Take 1 Tablet by mouth every day.    losartan (COZAAR) 100 MG Tab Take 1 Tablet by mouth every day.    hydroCHLOROthiazide (HYDRODIURIL) 25 MG Tab Take 1 Tablet by mouth every day.    minocycline (MINOCIN) 100 MG Cap Take 100 mg by mouth as needed.    albuterol 108 (90 Base) MCG/ACT Aero Soln inhalation aerosol 2 Puffs 1 time a day as needed.    VITAMIN E PO Take  by mouth every day.    Omega-3 Fatty Acids (OMEGA 3 PO) Take  by mouth every day.    budesonide-formoterol (SYMBICORT) 160-4.5 MCG/ACT Aerosol Inhale 1 Puff 2 times a day.    allopurinol (ZYLOPRIM) 300 MG Tab Take 300 mg by mouth every day.    lorazepam (ATIVAN) 1 MG Tab Take 1 mg by mouth 1 time a day as needed for Anxiety.    Multiple Vitamins-Minerals (CENTRUM SILVER PO) Take  by mouth every day.     SOCIAL HISTORY:  Social History     Tobacco Use    Smoking status: Never    Smokeless tobacco: Current   Vaping Use    Vaping Use: Never used   Substance Use Topics    Alcohol use: Yes     Alcohol/week: 21.0 oz     Types: 35 Cans of beer per week     Comment: 6 pack a day     Social History     Social History Narrative    Not on file     FAMILY HISTORY:  Family History   Problem Relation Age of Onset    Other Neg Hx         unknown, adopted     REVIEW OF  SYSTEMS:  A ROS was completed.  Pertinent positives and negatives were included in the HPI, above.  All other systems were reviewed and are negative.    PHYSICAL EXAM:  General/Medical:  - NAD  - hair, skin, nails, and joints were normal    Neuro:  MENTAL STATUS: awake and alert; no deficits of speech or language; oriented to person, place, and time; affect was appropriate to situation    CRANIAL NERVES:    II: acuity: J1-1/J1-1, fields: intact to confrontation, pupils: 3/3 to 2/2 without a relative afferent pupillary defect, discs: sharp, no red desaturation noted    III/IV/VI: versions: intact without nystagmus    V: facial sensation: symmetric to light touch    VII: facial expression: symmetric    VIII: hearing: intact to finger rub    IX/X: palate: elevates symmetrically    XI: shoulder shrug: symmetric    XII: tongue: midline    MOTOR:  - bulk: normal throughout  - tone: normal throughout  Upper Extremity Strength  (R/L)    5/5   Elbow flexion 5/5   Elbow extension 5/5   Shoulder abduction 5/5     Lower Extremity Strength  (R/L)   Hip flexion 5/5   Knee extension 5/5   Knee flexion 5/5   Ankle plantarflexion 5/5   Ankle dorsiflexion 5/5     - can walk on toes and heels  - pronator drift: absent  - abnormal movements: none    SENSATION:  - light touch: reduced over the feet with gradient to the level of the mid-thigh  - vibration (R/L, seconds): 22/19 at the MCPs, 6/15 at the great toes  - pinprick: reduced over the wrists (normal in the finger pads), loss of sharp/dull discrimination distal to the distal leg  - proprioception: intact at the fingers and great toes  - Romberg: absent    COORDINATION:  - finger to nose: normal, no ataxia on exam  - finger tapping: rapid and accurate, bilaterally    REFLEXES:  Reflex Right Left   BR 2+ 2+   Biceps 2+ 2+   Triceps 2+ 2+   Patellae 2+ 2+   Achilles 0 0   Toes mute mute     GAIT:  - mildly wide-based  - heel-raised/toe-raised gait: intact/intact  - tandem gait:  "difficulty with this    REVIEW OF IMAGING STUDIES:  No data available.    REVIEW OF LABORATORY STUDIES:  No data available.    ASSESSMENT:  Jacinto Turpin is a 62 y.o. man with peripheral neuropathy and a history most notable for asthma (mild, intermittent), substance use (alcohol), HTN, and HLD.  Jacinto's presentation is most consistent with a peripheral neuropathy.  He reports having blood work for this in the past, but I did not have access to these data today.  I have listed the labs commonly ordered for workup of peripheral neuropathy for his primary care team's reference.  I suspect Jacinto's neuropathy is related to longstanding, heavy alcohol use.  I counseled him regarding the importance of reducing (ideally eliminating) alcohol intake in order to slow the progression of symptoms as much as possible.    PLAN:  Peripheral Neuropathy  - most likely related to alcohol use  - primary team to ensure the following have been checked: CBC, CMP, HbA1c, TSH, vitamin B12, SPEP  - reduce/eliminate alcohol intake    Follow-Up:  - Return if symptoms worsen or fail to improve.    Signed: Chirag Orr M.D.    BILLING DOCUMENTATION:   I spent 72 minutes reviewing the medical record, interviewing and examining the patient, discussing my impression (see \"assessment\" above), and coordinating care.  "

## 2023-07-12 ENCOUNTER — TELEPHONE (OUTPATIENT)
Dept: CARDIOLOGY | Facility: MEDICAL CENTER | Age: 63
End: 2023-07-12
Payer: COMMERCIAL

## 2023-07-12 DIAGNOSIS — I10 ESSENTIAL HYPERTENSION, BENIGN: ICD-10-CM

## 2023-07-12 NOTE — TELEPHONE ENCOUNTER
"GI Daily Progress Note  Subjective:    Chief Complaint: Follow-up upper GI bleeding.    Patient continues to have thin melenic stool in his ostomy.  Denies nausea or abdominal pain.  Appetite remains poor, but is drinking some liquids.    Objective:    /70   Pulse 94   Temp 98.4 °F (36.9 °C) (Oral)   Resp 20   Ht 162.6 cm (64\")   Wt 78 kg (172 lb)   SpO2 98%   BMI 29.52 kg/m²     Physical Exam  Constitutional:       General: He is not in acute distress.     Appearance: He is ill-appearing. He is not toxic-appearing or diaphoretic.   HENT:      Head: Normocephalic.      Nose: Nose normal. No congestion.      Mouth/Throat:      Mouth: Mucous membranes are moist.      Pharynx: No oropharyngeal exudate.   Eyes:      General: No scleral icterus.     Conjunctiva/sclera: Conjunctivae normal.   Cardiovascular:      Rate and Rhythm: Normal rate.      Pulses: Normal pulses.   Pulmonary:      Effort: Pulmonary effort is normal. No respiratory distress.      Breath sounds: No wheezing or rales.      Comments: Mild tachypnea.  Abdominal:      General: Bowel sounds are normal. There is no distension.      Tenderness: There is no abdominal tenderness. There is no guarding.      Comments: Colostomy with thin melenic stool.   Genitourinary:     Comments: Deferred  Musculoskeletal:         General: No deformity.      Cervical back: Normal range of motion.   Skin:     General: Skin is warm and dry.      Capillary Refill: Capillary refill takes less than 2 seconds.      Coloration: Skin is not jaundiced or pale.      Findings: No erythema or rash.   Neurological:      General: No focal deficit present.      Mental Status: He is alert and oriented to person, place, and time.   Psychiatric:         Mood and Affect: Mood normal.         Lab  Lab Results   Component Value Date    WBC 0.48 (C) 10/14/2022    HGB 8.3 (L) 10/14/2022    HGB 9.2 (L) 10/14/2022    HGB 7.9 (L) 10/13/2022    MCV 83.4 10/14/2022     (L) " Labs faxed to fax number provided   10/14/2022       Lab Results   Component Value Date    GLUCOSE 141 (H) 10/14/2022    BUN 15 10/14/2022    CREATININE 0.34 (L) 10/14/2022    BCR 44.1 (H) 10/14/2022     10/14/2022    K 3.1 (L) 10/14/2022    CO2 28.0 10/14/2022    CALCIUM 7.9 (L) 10/14/2022    ALBUMIN 2.30 (L) 10/14/2022    ALKPHOS 190 (H) 10/14/2022    BILITOT 1.7 (H) 10/14/2022    ALT 20 10/14/2022    AST 17 10/14/2022       Assessment:    1.  Acute duodenal ulcer in the second portion duodenum, with hemorrhage. Ovesco clip failed to achieve durable hemostasis..  Postop day #2 GDA embolization.  Continues to have melena, but current hemoglobin reflects appropriate rise following 1 unit packed red blood cells given last evening. 10 units total transfusion since admission.  2.  Acute blood loss anemia.  3.  Severe esophageal candidiasis, improved on repeat endoscopy yesterday.  4.  Widespread Hodgkin's lymphoma.  5.  Pancytopenia, reflecting recent chemotherapy.    Plan:    >> Continue high-dose PPI.  >> Personal review of tagged RBC bleeding scan shows no evidence of active GI bleed.  In view of stabilizing hemoglobin and negative bleeding scan, will defer repeat endoscopy/additional IR embolization.  >> Neutropenic diet.    Mark I. Brunner, MD  10/14/22  14:17 EDT

## 2023-07-12 NOTE — TELEPHONE ENCOUNTER
AB    Caller: Jacinto Turpin     Topic/issue: Pt called in regards to getting his lab orders sent [place] [facility].   Fax: [phone]    Callback Number: [phone] (home)      Thank you,     Shree ALANIS

## 2023-07-12 NOTE — TELEPHONE ENCOUNTER
Is the patient due for a refill? Yes    Was the patient seen the past year? Yes    Date of last office visit: 12.21.22    Does the patient have an upcoming appointment?  Yes   If yes, When? 8.9.23    Provider to refill:AB    Does the patients insurance require a 100 day supply?  No

## 2023-07-13 RX ORDER — METOPROLOL SUCCINATE 50 MG/1
50 TABLET, EXTENDED RELEASE ORAL DAILY
Qty: 90 TABLET | Refills: 0 | Status: SHIPPED | OUTPATIENT
Start: 2023-07-13 | End: 2023-08-09 | Stop reason: SDUPTHER

## 2023-07-13 RX ORDER — LOSARTAN POTASSIUM 100 MG/1
100 TABLET ORAL DAILY
Qty: 90 TABLET | Refills: 0 | Status: SHIPPED | OUTPATIENT
Start: 2023-07-13 | End: 2023-08-09 | Stop reason: SDUPTHER

## 2023-07-13 RX ORDER — HYDROCHLOROTHIAZIDE 25 MG/1
25 TABLET ORAL DAILY
Qty: 90 TABLET | Refills: 0 | Status: SHIPPED | OUTPATIENT
Start: 2023-07-13 | End: 2023-08-09 | Stop reason: SDUPTHER

## 2023-07-17 ENCOUNTER — TELEPHONE (OUTPATIENT)
Dept: CARDIOLOGY | Facility: MEDICAL CENTER | Age: 63
End: 2023-07-17
Payer: COMMERCIAL

## 2023-08-09 ENCOUNTER — OFFICE VISIT (OUTPATIENT)
Dept: CARDIOLOGY | Facility: PHYSICIAN GROUP | Age: 63
End: 2023-08-09
Payer: MEDICARE

## 2023-08-09 VITALS
DIASTOLIC BLOOD PRESSURE: 82 MMHG | BODY MASS INDEX: 37.5 KG/M2 | OXYGEN SATURATION: 100 % | WEIGHT: 307.98 LBS | SYSTOLIC BLOOD PRESSURE: 124 MMHG | HEART RATE: 69 BPM | RESPIRATION RATE: 12 BRPM | HEIGHT: 76 IN

## 2023-08-09 DIAGNOSIS — R73.9 HYPERGLYCEMIA: ICD-10-CM

## 2023-08-09 DIAGNOSIS — I10 ESSENTIAL HYPERTENSION, BENIGN: ICD-10-CM

## 2023-08-09 DIAGNOSIS — R79.89 ELEVATED LFTS: ICD-10-CM

## 2023-08-09 DIAGNOSIS — G62.9 PERIPHERAL POLYNEUROPATHY: ICD-10-CM

## 2023-08-09 DIAGNOSIS — R73.01 IMPAIRED FASTING GLUCOSE: ICD-10-CM

## 2023-08-09 DIAGNOSIS — E78.5 DYSLIPIDEMIA: ICD-10-CM

## 2023-08-09 PROCEDURE — 3074F SYST BP LT 130 MM HG: CPT | Performed by: NURSE PRACTITIONER

## 2023-08-09 PROCEDURE — 99214 OFFICE O/P EST MOD 30 MIN: CPT | Performed by: NURSE PRACTITIONER

## 2023-08-09 PROCEDURE — 3079F DIAST BP 80-89 MM HG: CPT | Performed by: NURSE PRACTITIONER

## 2023-08-09 RX ORDER — HYDROCHLOROTHIAZIDE 25 MG/1
25 TABLET ORAL DAILY
Qty: 100 TABLET | Refills: 3 | Status: SHIPPED | OUTPATIENT
Start: 2023-08-09 | End: 2024-02-07 | Stop reason: SDUPTHER

## 2023-08-09 RX ORDER — METOPROLOL SUCCINATE 50 MG/1
50 TABLET, EXTENDED RELEASE ORAL DAILY
Qty: 100 TABLET | Refills: 3 | Status: SHIPPED | OUTPATIENT
Start: 2023-08-09 | End: 2024-02-07 | Stop reason: SDUPTHER

## 2023-08-09 RX ORDER — LOSARTAN POTASSIUM 100 MG/1
100 TABLET ORAL DAILY
Qty: 100 TABLET | Refills: 3 | Status: SHIPPED | OUTPATIENT
Start: 2023-08-09 | End: 2024-02-07 | Stop reason: SDUPTHER

## 2023-08-09 ASSESSMENT — ENCOUNTER SYMPTOMS
DIZZINESS: 0
ABDOMINAL PAIN: 0
COUGH: 0
PND: 0
ORTHOPNEA: 0
BACK PAIN: 1
CHILLS: 0
BRUISES/BLEEDS EASILY: 0
HEADACHES: 0
MYALGIAS: 0
LOSS OF CONSCIOUSNESS: 0
PALPITATIONS: 0
FEVER: 0
INSOMNIA: 0
SHORTNESS OF BREATH: 0
NAUSEA: 0

## 2023-08-09 NOTE — PROGRESS NOTES
Chief Complaint   Patient presents with    Follow-Up    Hyperlipidemia    Hypertension       Subjective     Jacinto Turpin is a 62 y.o. male who presents today for six month follow-up of HTN, hyperlipidemia, and mild asthma.    Jacinto is a 62 year old male with history of hypertension, hyperlipidemia, mild asthma and costochondritis, last seen by me in December 2022.    In January 2023, he did have left foot surgery; he is making progress in his recover.      Since the last visit, he is stable. He is still drinking EtOH, but has cut down from 4-6 drinks per day to 2-3 drinks per day. LFTs have improve.     From a cardiac standpoint, he has been stable: no chest pain, pressure or discomfort; no symptomatic palpitations; no shortness of breath, orthopnea or PND; no asthma flare-ups, and he is on a new inhaler; no dizziness or syncope; very mild, stable LE edema. BP has been better.    Past Medical History:   Diagnosis Date    Asthma     Bronchitis     Chronic pain syndrome     Costochondritis     Dyslipidemia     Essential hypertension, benign 09/2017     Echocardiogram with normal LV size, mild concentric LVH, LVEF 65%. Normal RA, LA and RV. Trace MR.    Lymphedema     Neuropathy     Stroke (HCC)     small 06 or 07, no residual deficits     Past Surgical History:   Procedure Laterality Date    PB OSTEOTOMY METATARSAL (NOT 1ST) Left 1/27/2023    Procedure: LEFT FOURTH METATARSAL OSTEOTOMY;  Surgeon: JONE GonzalezPCONRADO;  Location: SURGERY [facility];  Service: Podiatry    HERNIA REPAIR      above scrotum, also undesceded testicle    MUSCLE REPAIR Right     calf and tendon repair    ORCHIECTOMY      SINUSOTOMIES      turnbinates, clipped uvula, deviated septum     Family History   Problem Relation Age of Onset    Other Neg Hx         unknown, adopted     Social History     Socioeconomic History    Marital status: Single     Spouse name: Not on file    Number of children: Not on file    Years of education: Not on file     Highest education level: Not on file   Occupational History    Not on file   Tobacco Use    Smoking status: Never    Smokeless tobacco: Current   Vaping Use    Vaping Use: Never used   Substance and Sexual Activity    Alcohol use: Yes     Alcohol/week: 21.0 oz     Types: 35 Cans of beer per week     Comment: 4-5 beers daily    Drug use: No    Sexual activity: Not on file   Other Topics Concern    Not on file   Social History Narrative    Not on file     Social Determinants of Health     Financial Resource Strain: Not on file   Food Insecurity: Not on file   Transportation Needs: Not on file   Physical Activity: Not on file   Stress: Not on file   Social Connections: Not on file   Intimate Partner Violence: Not on file   Housing Stability: Not on file     No Known Allergies  Outpatient Encounter Medications as of 8/9/2023   Medication Sig Dispense Refill    metoprolol SR (TOPROL XL) 50 MG TABLET SR 24 HR Take 1 Tablet by mouth every day. 100 Tablet 3    hydroCHLOROthiazide (HYDRODIURIL) 25 MG Tab Take 1 Tablet by mouth every day. 100 Tablet 3    losartan (COZAAR) 100 MG Tab Take 1 Tablet by mouth every day. 100 Tablet 3    meloxicam (MOBIC) 7.5 MG Tab Take 7.5 mg by mouth 1 time a day as needed.      asa/apap/caffeine (EXCEDRIN) 250-250-65 MG Tab Take 1 Tablet by mouth every 6 hours as needed for Headache. Indications: Arthritis, Headache      fluticasone (FLONASE) 50 MCG/ACT nasal spray Administer  into affected nostril(S) every day.      minocycline (MINOCIN) 100 MG Cap Take 100 mg by mouth as needed.      albuterol 108 (90 Base) MCG/ACT Aero Soln inhalation aerosol 2 Puffs 1 time a day as needed.      VITAMIN E PO Take  by mouth every day.      Omega-3 Fatty Acids (OMEGA 3 PO) Take  by mouth every day.      budesonide-formoterol (SYMBICORT) 160-4.5 MCG/ACT Aerosol Inhale 1 Puff 2 times a day.      allopurinol (ZYLOPRIM) 300 MG Tab Take 300 mg by mouth every day.      lorazepam (ATIVAN) 1 MG Tab Take 1 mg by  "mouth 1 time a day as needed for Anxiety.      Multiple Vitamins-Minerals (CENTRUM SILVER PO) Take  by mouth every day.      [DISCONTINUED] hydroCHLOROthiazide (HYDRODIURIL) 25 MG Tab TAKE 1 TABLET BY MOUTH EVERY DAY. 90 Tablet 0    [DISCONTINUED] losartan (COZAAR) 100 MG Tab TAKE 1 TABLET BY MOUTH EVERY DAY. 90 Tablet 0    [DISCONTINUED] metoprolol SR (TOPROL XL) 50 MG TABLET SR 24 HR TAKE 1 TABLET BY MOUTH EVERY DAY. 90 Tablet 0     No facility-administered encounter medications on file as of 8/9/2023.     Review of Systems   Constitutional:  Negative for chills and fever.   HENT:  Negative for congestion.    Respiratory:  Negative for cough and shortness of breath.    Cardiovascular:  Negative for chest pain, palpitations, orthopnea, leg swelling and PND.   Gastrointestinal:  Negative for abdominal pain and nausea.   Musculoskeletal:  Positive for back pain and joint pain. Negative for myalgias.   Skin:  Negative for rash.   Neurological:  Negative for dizziness, loss of consciousness and headaches.   Endo/Heme/Allergies:  Does not bruise/bleed easily.   Psychiatric/Behavioral:  The patient does not have insomnia.               Objective     /82 (BP Location: Left arm, Patient Position: Sitting, BP Cuff Size: Large adult)   Pulse 69   Resp 12   Ht 1.93 m (6' 4\")   Wt (!) 140 kg (307 lb 15.7 oz)   SpO2 100%   BMI 37.49 kg/m²     Physical Exam  Constitutional:       Appearance: He is well-developed.      Comments: BMI 37.49 (weight is down slightly)   HENT:      Head: Normocephalic.   Neck:      Vascular: No JVD.   Cardiovascular:      Rate and Rhythm: Normal rate and regular rhythm.      Heart sounds: Normal heart sounds.   Pulmonary:      Effort: Pulmonary effort is normal. No respiratory distress.      Breath sounds: Normal breath sounds. No wheezing or rales.   Abdominal:      General: Bowel sounds are normal. There is no distension.      Palpations: Abdomen is soft.      Tenderness: There is no " abdominal tenderness.   Musculoskeletal:         General: Normal range of motion.      Cervical back: Normal range of motion and neck supple.   Skin:     General: Skin is warm and dry.      Findings: No rash.   Neurological:      Mental Status: He is alert and oriented to person, place, and time.       FINDINGS OF CTCS OF 5/15/2019:  Coronary calcification:  LMA - 0.0  LCX - 0.0  LAD - 0.0  RCA - 0.0  PDA - 0.0  Total Calcium Score: 0.0     CONCLUSIONS OF ECHOCARDIOGRAM OF 9/27/2017:  No prior study is available for comparison.   Normal left ventricular systolic function.   No evidence of valvular abnormality based on Doppler evaluation.   Unable to estimate pulmonary artery pressure due to an inadequate tricuspid regurgitant jet.     LABS AS OF 8/2/2023:  WBC 6.73  RBC 5.14  Hgb 15.6  Hct 46.2  Platelets 203  Glucose 216  BUN 12  Creatinine 1.12  Potassium 3.7  ALT 52  AST 46  GFR 71           Assessment & Plan     1. Essential hypertension, benign  metoprolol SR (TOPROL XL) 50 MG TABLET SR 24 HR    hydroCHLOROthiazide (HYDRODIURIL) 25 MG Tab    losartan (COZAAR) 100 MG Tab      2. Dyslipidemia  Lipid Profile      3. Elevated LFTs        4. Hyperglycemia  HEMOGLOBIN A1C      5. Impaired fasting glucose  HEMOGLOBIN A1C      6. Peripheral polyneuropathy            Medical Decision Making: Today's Assessment/Status/Plan:      1. Hypertension, treated with Toprol XL 50mg, HCTZ 25mg and Losartan 100mg once daily. BP is better.    2. Hyperlipidemia, not currently on any therapy. To check fasting lipid panel.    3. Hyperglycemia (glucose 216), to check fasting HgbA1c.    4. Peripheral neuropathy, he did see neurology with little change in treatment plan.    5. EtOH intake, has cut down, urged to cut down further. LFTs have improved.    Same medications. Work on lifestyle modifications. Labs to include lipid panel and HgbA1c. Follow-up in 6 months.

## 2024-01-30 ENCOUNTER — TELEPHONE (OUTPATIENT)
Dept: CARDIOLOGY | Facility: MEDICAL CENTER | Age: 64
End: 2024-01-30
Payer: COMMERCIAL

## 2024-01-30 NOTE — TELEPHONE ENCOUNTER
Called patient and reminded pt to complete fasting labs. Pt will complete at [facility], I will request a day or 2 prior to appt.

## 2024-02-05 LAB
CHOLEST SERPL-MCNC: 189 MG/DL
HDLC SERPL-MCNC: 74 MG/DL
LDLC SERPL CALC-MCNC: 79 MG/DL
TRIGL SERPL-MCNC: 190 MG/DL

## 2024-02-07 ENCOUNTER — OFFICE VISIT (OUTPATIENT)
Dept: CARDIOLOGY | Facility: PHYSICIAN GROUP | Age: 64
End: 2024-02-07
Payer: MEDICARE

## 2024-02-07 VITALS
SYSTOLIC BLOOD PRESSURE: 122 MMHG | HEART RATE: 74 BPM | DIASTOLIC BLOOD PRESSURE: 78 MMHG | RESPIRATION RATE: 12 BRPM | WEIGHT: 314.82 LBS | HEIGHT: 76 IN | BODY MASS INDEX: 38.34 KG/M2 | OXYGEN SATURATION: 97 %

## 2024-02-07 DIAGNOSIS — R73.01 IMPAIRED FASTING GLUCOSE: ICD-10-CM

## 2024-02-07 DIAGNOSIS — E78.5 DYSLIPIDEMIA: ICD-10-CM

## 2024-02-07 DIAGNOSIS — J45.20 MILD INTERMITTENT ASTHMA, UNSPECIFIED WHETHER COMPLICATED: ICD-10-CM

## 2024-02-07 DIAGNOSIS — R73.9 HYPERGLYCEMIA: ICD-10-CM

## 2024-02-07 DIAGNOSIS — I10 ESSENTIAL HYPERTENSION, BENIGN: ICD-10-CM

## 2024-02-07 DIAGNOSIS — E78.2 MIXED HYPERLIPIDEMIA: ICD-10-CM

## 2024-02-07 PROCEDURE — 3078F DIAST BP <80 MM HG: CPT | Performed by: NURSE PRACTITIONER

## 2024-02-07 PROCEDURE — 3074F SYST BP LT 130 MM HG: CPT | Performed by: NURSE PRACTITIONER

## 2024-02-07 PROCEDURE — 99214 OFFICE O/P EST MOD 30 MIN: CPT | Performed by: NURSE PRACTITIONER

## 2024-02-07 RX ORDER — METOPROLOL SUCCINATE 50 MG/1
50 TABLET, EXTENDED RELEASE ORAL DAILY
Qty: 100 TABLET | Refills: 3 | Status: SHIPPED | OUTPATIENT
Start: 2024-02-07

## 2024-02-07 RX ORDER — LOSARTAN POTASSIUM 100 MG/1
100 TABLET ORAL DAILY
Qty: 100 TABLET | Refills: 3 | Status: SHIPPED | OUTPATIENT
Start: 2024-02-07

## 2024-02-07 RX ORDER — FLUTICASONE FUROATE AND VILANTEROL 100; 25 UG/1; UG/1
POWDER RESPIRATORY (INHALATION)
COMMUNITY

## 2024-02-07 RX ORDER — HYDROCHLOROTHIAZIDE 25 MG/1
25 TABLET ORAL DAILY
Qty: 100 TABLET | Refills: 3 | Status: SHIPPED | OUTPATIENT
Start: 2024-02-07

## 2024-02-07 RX ORDER — TAMSULOSIN HYDROCHLORIDE 0.4 MG/1
0.4 CAPSULE ORAL DAILY
COMMUNITY

## 2024-02-07 ASSESSMENT — ENCOUNTER SYMPTOMS
COUGH: 0
ORTHOPNEA: 0
BRUISES/BLEEDS EASILY: 0
FEVER: 0
PALPITATIONS: 0
HEADACHES: 0
BACK PAIN: 1
DIZZINESS: 0
LOSS OF CONSCIOUSNESS: 0
CHILLS: 0
ABDOMINAL PAIN: 0
PND: 0
SHORTNESS OF BREATH: 0
MYALGIAS: 0
NAUSEA: 0
INSOMNIA: 0

## 2024-02-07 NOTE — PROGRESS NOTES
Chief Complaint   Patient presents with    Follow-Up    HTN (Controlled)    Hyperlipidemia    Hyperglycemia       Subjective     Jacinto Turpin is a 63 y.o. male who presents today for six month follow-up of HTN, hyperlipidemia and mild asthma.    Jacinto is a 63 year old male with history of hypertension, hyperlipidemia (with CTCS score in 2019 of 0.00), mild asthma and costochondritis, last seen by me in August 2023.    He is here today for annual follow-up. Since the last visit, he is stable. He denies any chest pain, pressure, tightness or discomfort; no symptomatic palpitations; no shortness of breath, orthopnea or PND; no asthma flare-ups; no dizziness or syncope; very mild, stable LE edema. BP has been stable, but he doesn't check it at home regularly.    Past Medical History:   Diagnosis Date    Asthma     Bronchitis     Chronic pain syndrome     Costochondritis     Dyslipidemia 2019    CTCS score of 0.00    Essential hypertension, benign 09/2017     Echocardiogram with normal LV size, mild concentric LVH, LVEF 65%. Normal RA, LA and RV. Trace MR.    Lymphedema     Neuropathy     Stroke (HCC)     small 06 or 07, no residual deficits     Past Surgical History:   Procedure Laterality Date    PB OSTEOTOMY METATARSAL (NOT 1ST) Left 1/27/2023    Procedure: LEFT FOURTH METATARSAL OSTEOTOMY;  Surgeon: Edgardo Quiles D.P.M.;  Location: SURGERY [facility];  Service: Podiatry    HERNIA REPAIR      above scrotum, also undesceded testicle    MUSCLE REPAIR Right     calf and tendon repair    ORCHIECTOMY      SINUSOTOMIES      turnbinates, clipped uvula, deviated septum     Family History   Problem Relation Age of Onset    Other Neg Hx         unknown, adopted     Social History     Socioeconomic History    Marital status: Single     Spouse name: Not on file    Number of children: Not on file    Years of education: Not on file    Highest education level: Not on file   Occupational History    Not on file   Tobacco Use     Smoking status: Never    Smokeless tobacco: Current     Types: Chew    Tobacco comments:     Rare   Vaping Use    Vaping Use: Never used   Substance and Sexual Activity    Alcohol use: Yes     Alcohol/week: 21.0 oz     Types: 35 Cans of beer per week     Comment: 3-4 beers daily    Drug use: No    Sexual activity: Not on file   Other Topics Concern    Not on file   Social History Narrative    Not on file     Social Determinants of Health     Financial Resource Strain: Not on file   Food Insecurity: Not on file   Transportation Needs: Not on file   Physical Activity: Not on file   Stress: Not on file   Social Connections: Not on file   Intimate Partner Violence: Not on file   Housing Stability: Not on file     No Known Allergies  Outpatient Encounter Medications as of 2/7/2024   Medication Sig Dispense Refill    fluticasone furoate-vilanterol (BREO) 100-25 MCG/ACT AEROSOL POWDER, BREATH ACTIVATED INHALE 1 PUFF BY MOUTH ONCE DAILY FOR ASTHMA. RINSE MOUTH AFTER EACH USE      tamsulosin (FLOMAX) 0.4 MG capsule Take 0.4 mg by mouth every day.      metoprolol SR (TOPROL XL) 50 MG TABLET SR 24 HR Take 1 Tablet by mouth every day. 100 Tablet 3    hydroCHLOROthiazide 25 MG Tab Take 1 Tablet by mouth every day. 100 Tablet 3    losartan (COZAAR) 100 MG Tab Take 1 Tablet by mouth every day. 100 Tablet 3    meloxicam (MOBIC) 7.5 MG Tab Take 7.5 mg by mouth 1 time a day as needed.      asa/apap/caffeine (EXCEDRIN) 250-250-65 MG Tab Take 1 Tablet by mouth every 6 hours as needed for Headache. Indications: Arthritis, Headache      fluticasone (FLONASE) 50 MCG/ACT nasal spray Administer  into affected nostril(S) every day.      minocycline (MINOCIN) 100 MG Cap Take 100 mg by mouth as needed.      albuterol 108 (90 Base) MCG/ACT Aero Soln inhalation aerosol 2 Puffs 1 time a day as needed.      VITAMIN E PO Take  by mouth every day.      Omega-3 Fatty Acids (OMEGA 3 PO) Take  by mouth every day.      allopurinol (ZYLOPRIM) 300 MG Tab  "Take 300 mg by mouth every day.      lorazepam (ATIVAN) 1 MG Tab Take 1 mg by mouth 1 time a day as needed for Anxiety.      Multiple Vitamins-Minerals (CENTRUM SILVER PO) Take  by mouth every day.      [DISCONTINUED] metoprolol SR (TOPROL XL) 50 MG TABLET SR 24 HR Take 1 Tablet by mouth every day. 100 Tablet 3    [DISCONTINUED] hydroCHLOROthiazide (HYDRODIURIL) 25 MG Tab Take 1 Tablet by mouth every day. 100 Tablet 3    [DISCONTINUED] losartan (COZAAR) 100 MG Tab Take 1 Tablet by mouth every day. 100 Tablet 3    [DISCONTINUED] budesonide-formoterol (SYMBICORT) 160-4.5 MCG/ACT Aerosol Inhale 1 Puff 2 times a day. (Patient not taking: Reported on 2/7/2024)       No facility-administered encounter medications on file as of 2/7/2024.     Review of Systems   Constitutional:  Negative for chills and fever.   HENT:  Negative for congestion.    Respiratory:  Negative for cough and shortness of breath.    Cardiovascular:  Negative for chest pain, palpitations, orthopnea, leg swelling and PND.   Gastrointestinal:  Negative for abdominal pain and nausea.   Musculoskeletal:  Positive for back pain and joint pain. Negative for myalgias.   Skin:  Negative for rash.   Neurological:  Negative for dizziness, loss of consciousness and headaches.   Endo/Heme/Allergies:  Does not bruise/bleed easily.   Psychiatric/Behavioral:  The patient does not have insomnia.               Objective     /78 (BP Location: Left arm, Patient Position: Sitting, BP Cuff Size: Large adult)   Pulse 74   Resp 12   Ht 1.93 m (6' 4\")   Wt (!) 143 kg (314 lb 13.1 oz)   SpO2 97%   BMI 38.32 kg/m²     Physical Exam  Constitutional:       Appearance: He is well-developed.      Comments: BMI 38.32 (weight is up since last visit)   HENT:      Head: Normocephalic.   Neck:      Vascular: No JVD.   Cardiovascular:      Rate and Rhythm: Normal rate and regular rhythm.      Heart sounds: Normal heart sounds.   Pulmonary:      Effort: Pulmonary effort is " normal. No respiratory distress.      Breath sounds: Normal breath sounds. No wheezing or rales.   Abdominal:      General: Bowel sounds are normal. There is no distension.      Palpations: Abdomen is soft.      Tenderness: There is no abdominal tenderness.   Musculoskeletal:         General: Normal range of motion.      Cervical back: Normal range of motion and neck supple.   Skin:     General: Skin is warm and dry.      Findings: No rash.   Neurological:      Mental Status: He is alert and oriented to person, place, and time.     10-year ASCVD risk (based on current values): 8.0%    FINDINGS OF CTCS OF 5/15/2019:  Coronary calcification:  LMA - 0.0  LCX - 0.0  LAD - 0.0  RCA - 0.0  PDA - 0.0  Total Calcium Score: 0.0     CONCLUSIONS OF TTE OF 9/20/2017:  No prior study is available for comparison.   Normal left ventricular systolic function.   No evidence of valvular abnormality based on Doppler evaluation.   Unable to estimate pulmonary artery pressure due to an inadequate tricuspid regurgitant jet.    LABS OF 2/5/2024 ([facility])  Cholesterol 189  Triglycerides 190  HDL 74  LDL 79  Cholesterol/HDL ratio 2.6  HgbA1c 5.7    Assessment & Plan     1. Essential hypertension, benign  metoprolol SR (TOPROL XL) 50 MG TABLET SR 24 HR    hydroCHLOROthiazide 25 MG Tab    losartan (COZAAR) 100 MG Tab      2. Mixed hyperlipidemia  CT-CARDIAC SCORING      3. Dyslipidemia        4. Mild intermittent asthma, unspecified whether complicated        5. Hyperglycemia        6. Impaired fasting glucose            Medical Decision Making: Today's Assessment/Status/Plan:      1. Hypertension, treated with Toprol XL 50mg, HCTZ 25mg  and Losartan 100mg once daily. BP is stable today. These are renewed.    2. Hyperlipidemia, borderline, with elevated 10-year ASCVD risk. He had a CTCS in 2019 with score of 0.00; we will repeat this.    3. Mild asthma, treated with inhalers, stable.    4. Hyperglycemia/impaired fasting  glucose, recent HgbA1c was 5.7. We discussed trying to making some meaningful dietary changes, and at least cut down portion sizes.    Same medications for now. Work on lifestyle modifications. Follow-up in 6 months, sooner if clinical condition changes.

## 2024-03-26 ENCOUNTER — TELEPHONE (OUTPATIENT)
Dept: CARDIOLOGY | Facility: MEDICAL CENTER | Age: 64
End: 2024-03-26
Payer: COMMERCIAL

## 2024-03-26 NOTE — TELEPHONE ENCOUNTER
AB    Caller: [facility]    Topic/issue: [facility] calling about an order for Hemoglobin and Lipid that they received for Jacinto Turpin. They state that the order is incomplete because it does not include ALL listed diagnosis. They requested that we please fax again with all diagnosis list.     Fax Number: [phone]     Thank you,  Magdalena FLOWERS

## 2024-03-26 NOTE — TELEPHONE ENCOUNTER
I do not see outstanding labs, all lab orders in chart have been resulted. No contact number left for me to call for clarification.

## 2024-06-04 ENCOUNTER — TELEPHONE (OUTPATIENT)
Dept: CARDIOLOGY | Facility: MEDICAL CENTER | Age: 64
End: 2024-06-04
Payer: MEDICARE

## 2024-06-04 NOTE — TELEPHONE ENCOUNTER
Phone Number Called: [phone]     Call outcome: Spoke to Constance    Message: she was calling from the billing department to determine if there were any other diagnosis to be added to A1c and lipid panel lab order. Advised those were the correct diagnosis. Answered all questions and concerns, appreciative of call.

## 2024-06-04 NOTE — TELEPHONE ENCOUNTER
AB        Caller: Constance with [facility]      Topic/issue: Their office was calling about lab orders that were sent over for this patient for a lipid profile and hemoglobin and they were needed to see if there was any additional diagnosis for this patient that needed to be included with the lab orders         Callback Number: [phone]      Thank you    -Adan FLOWERS